# Patient Record
Sex: MALE
[De-identification: names, ages, dates, MRNs, and addresses within clinical notes are randomized per-mention and may not be internally consistent; named-entity substitution may affect disease eponyms.]

---

## 2018-01-19 ENCOUNTER — HOSPITAL ENCOUNTER (OUTPATIENT)
Dept: HOSPITAL 88 - NPA | Age: 48
End: 2018-01-19
Attending: INTERNAL MEDICINE
Payer: COMMERCIAL

## 2018-01-19 DIAGNOSIS — Z02.89: Primary | ICD-10-CM

## 2018-01-19 PROCEDURE — 87493 C DIFF AMPLIFIED PROBE: CPT

## 2018-02-14 ENCOUNTER — HOSPITAL ENCOUNTER (OUTPATIENT)
Dept: HOSPITAL 88 - NPA | Age: 48
End: 2018-02-14
Attending: INTERNAL MEDICINE
Payer: COMMERCIAL

## 2018-02-14 DIAGNOSIS — Z02.89: Primary | ICD-10-CM

## 2018-02-14 LAB
ANION GAP SERPL CALC-SCNC: 16.1 MMOL/L (ref 8–16)
BASOPHILS # BLD AUTO: 0 10*3/UL (ref 0–0.1)
BASOPHILS NFR BLD AUTO: 0.5 % (ref 0–1)
BUN SERPL-MCNC: 43 MG/DL (ref 7–26)
BUN/CREAT SERPL: 46 (ref 6–25)
CALCIUM SERPL-MCNC: 8.8 MG/DL (ref 8.4–10.2)
CHLORIDE SERPL-SCNC: 104 MMOL/L (ref 98–107)
CO2 SERPL-SCNC: 23 MMOL/L (ref 22–29)
DEPRECATED NEUTROPHILS # BLD AUTO: 4.1 10*3/UL (ref 2.1–6.9)
EGFRCR SERPLBLD CKD-EPI 2021: > 60 ML/MIN (ref 60–?)
EOSINOPHIL # BLD AUTO: 0.4 10*3/UL (ref 0–0.4)
EOSINOPHIL NFR BLD AUTO: 5.6 % (ref 0–6)
ERYTHROCYTE [DISTWIDTH] IN CORD BLOOD: 19.2 % (ref 11.7–14.4)
GLUCOSE SERPLBLD-MCNC: 89 MG/DL (ref 74–118)
HCT VFR BLD AUTO: 29.2 % (ref 38.2–49.6)
HGB BLD-MCNC: 8.9 G/DL (ref 14–18)
LYMPHOCYTES # BLD: 1.4 10*3/UL (ref 1–3.2)
LYMPHOCYTES NFR BLD AUTO: 23 % (ref 18–39.1)
MCH RBC QN AUTO: 30.9 PG (ref 28–32)
MCHC RBC AUTO-ENTMCNC: 30.5 G/DL (ref 31–35)
MCV RBC AUTO: 101.4 FL (ref 81–99)
MONOCYTES # BLD AUTO: 0.3 10*3/UL (ref 0.2–0.8)
MONOCYTES NFR BLD AUTO: 5.4 % (ref 4.4–11.3)
NEUTS SEG NFR BLD AUTO: 64.9 % (ref 38.7–80)
PLATELET # BLD AUTO: 269 X10E3/UL (ref 140–360)
POTASSIUM SERPL-SCNC: 4.1 MMOL/L (ref 3.5–5.1)
RBC # BLD AUTO: 2.88 X10E6/UL (ref 4.3–5.7)
SODIUM SERPL-SCNC: 139 MMOL/L (ref 136–145)

## 2018-02-14 PROCEDURE — 85025 COMPLETE CBC W/AUTO DIFF WBC: CPT

## 2018-02-14 PROCEDURE — 80048 BASIC METABOLIC PNL TOTAL CA: CPT

## 2018-02-14 PROCEDURE — 36415 COLL VENOUS BLD VENIPUNCTURE: CPT

## 2019-01-18 ENCOUNTER — HOSPITAL ENCOUNTER (OUTPATIENT)
Dept: HOSPITAL 88 - RAD | Age: 49
End: 2019-01-18
Attending: INTERNAL MEDICINE
Payer: MEDICARE

## 2019-01-18 DIAGNOSIS — R09.89: ICD-10-CM

## 2019-01-18 DIAGNOSIS — J40: ICD-10-CM

## 2019-01-18 DIAGNOSIS — I50.32: ICD-10-CM

## 2019-01-18 DIAGNOSIS — R05: Primary | ICD-10-CM

## 2019-01-18 PROCEDURE — 71046 X-RAY EXAM CHEST 2 VIEWS: CPT

## 2019-01-18 NOTE — DIAGNOSTIC IMAGING REPORT
EXAMINATION: PA and lateral views of the chest.



COMPARISON: None



CLINICAL HISTORY:  Cough, congestion

     

DISCUSSION:



Left internal jugular tunneled hemodialysis catheter tip projects over the

superior cavoatrial junction. Lungs are well-inflated and without focal

airspace consolidation, pleural effusion, or pneumothorax. Atherosclerotic

calcification of the thoracic aorta. Otherwise normal cardiomediastinal

contour.



No acute osseous abnormality.



IMPRESSION: 

No acute cardiopulmonary abnormalities.











Signed by: Dr. Sarmad Hammonds M.D. on 1/18/2019 2:16 PM

## 2020-07-20 ENCOUNTER — HOSPITAL ENCOUNTER (INPATIENT)
Dept: HOSPITAL 88 - ER | Age: 50
LOS: 3 days | Discharge: HOME HEALTH | DRG: 291 | End: 2020-07-23
Attending: INTERNAL MEDICINE | Admitting: INTERNAL MEDICINE
Payer: MEDICARE

## 2020-07-20 VITALS — HEIGHT: 71 IN | WEIGHT: 188.01 LBS | BODY MASS INDEX: 26.32 KG/M2

## 2020-07-20 DIAGNOSIS — E87.5: ICD-10-CM

## 2020-07-20 DIAGNOSIS — N18.6: ICD-10-CM

## 2020-07-20 DIAGNOSIS — Z79.4: ICD-10-CM

## 2020-07-20 DIAGNOSIS — S31.30XA: ICD-10-CM

## 2020-07-20 DIAGNOSIS — B96.1: ICD-10-CM

## 2020-07-20 DIAGNOSIS — Z11.59: ICD-10-CM

## 2020-07-20 DIAGNOSIS — H54.8: ICD-10-CM

## 2020-07-20 DIAGNOSIS — S31.809A: ICD-10-CM

## 2020-07-20 DIAGNOSIS — D63.1: ICD-10-CM

## 2020-07-20 DIAGNOSIS — R53.81: ICD-10-CM

## 2020-07-20 DIAGNOSIS — Z99.2: ICD-10-CM

## 2020-07-20 DIAGNOSIS — I13.2: Primary | ICD-10-CM

## 2020-07-20 DIAGNOSIS — I50.33: ICD-10-CM

## 2020-07-20 DIAGNOSIS — E11.22: ICD-10-CM

## 2020-07-20 LAB
ALBUMIN SERPL-MCNC: 2.6 G/DL (ref 3.5–5)
ALBUMIN/GLOB SERPL: 0.4 {RATIO} (ref 0.8–2)
ALP SERPL-CCNC: 144 IU/L (ref 40–150)
ALT SERPL-CCNC: 10 IU/L (ref 0–55)
ANION GAP SERPL CALC-SCNC: 24.2 MMOL/L (ref 8–16)
BASOPHILS # BLD AUTO: 0.1 10*3/UL (ref 0–0.1)
BASOPHILS NFR BLD AUTO: 0.6 % (ref 0–1)
BUN SERPL-MCNC: 120 MG/DL (ref 7–26)
BUN/CREAT SERPL: 7 (ref 6–25)
CALCIUM SERPL-MCNC: 9.4 MG/DL (ref 8.4–10.2)
CHLORIDE SERPL-SCNC: 104 MMOL/L (ref 98–107)
CK MB SERPL-MCNC: 1.8 NG/ML (ref 0–5)
CK SERPL-CCNC: 22 IU/L (ref 30–200)
CO2 SERPL-SCNC: 18 MMOL/L (ref 22–29)
DEPRECATED APTT PLAS QN: 37.6 SECONDS (ref 23.8–35.5)
DEPRECATED INR PLAS: 1.05
DEPRECATED NEUTROPHILS # BLD AUTO: 6.2 10*3/UL (ref 2.1–6.9)
EGFRCR SERPLBLD CKD-EPI 2021: 3 ML/MIN (ref 60–?)
EOSINOPHIL # BLD AUTO: 0.2 10*3/UL (ref 0–0.4)
EOSINOPHIL NFR BLD AUTO: 2.5 % (ref 0–6)
ERYTHROCYTE [DISTWIDTH] IN CORD BLOOD: 14.6 % (ref 11.7–14.4)
GLOBULIN PLAS-MCNC: 5.8 G/DL (ref 2.3–3.5)
GLUCOSE SERPLBLD-MCNC: 87 MG/DL (ref 74–118)
HCT VFR BLD AUTO: 29 % (ref 38.2–49.6)
HGB BLD-MCNC: 8.8 G/DL (ref 14–18)
LYMPHOCYTES # BLD: 1.3 10*3/UL (ref 1–3.2)
LYMPHOCYTES NFR BLD AUTO: 15.4 % (ref 18–39.1)
MCH RBC QN AUTO: 30 PG (ref 28–32)
MCHC RBC AUTO-ENTMCNC: 30.3 G/DL (ref 31–35)
MCV RBC AUTO: 99 FL (ref 81–99)
MONOCYTES # BLD AUTO: 0.5 10*3/UL (ref 0.2–0.8)
MONOCYTES NFR BLD AUTO: 6 % (ref 4.4–11.3)
NEUTS SEG NFR BLD AUTO: 75.1 % (ref 38.7–80)
PLATELET # BLD AUTO: 226 X10E3/UL (ref 140–360)
POTASSIUM SERPL-SCNC: 6.2 MMOL/L (ref 3.5–5.1)
PROTHROMBIN TIME: 14.4 SECONDS (ref 11.9–14.5)
RBC # BLD AUTO: 2.93 X10E6/UL (ref 4.3–5.7)
SODIUM SERPL-SCNC: 140 MMOL/L (ref 136–145)

## 2020-07-20 PROCEDURE — 84484 ASSAY OF TROPONIN QUANT: CPT

## 2020-07-20 PROCEDURE — 80053 COMPREHEN METABOLIC PANEL: CPT

## 2020-07-20 PROCEDURE — 85610 PROTHROMBIN TIME: CPT

## 2020-07-20 PROCEDURE — 87186 SC STD MICRODIL/AGAR DIL: CPT

## 2020-07-20 PROCEDURE — 87071 CULTURE AEROBIC QUANT OTHER: CPT

## 2020-07-20 PROCEDURE — 99284 EMERGENCY DEPT VISIT MOD MDM: CPT

## 2020-07-20 PROCEDURE — 36415 COLL VENOUS BLD VENIPUNCTURE: CPT

## 2020-07-20 PROCEDURE — 82948 REAGENT STRIP/BLOOD GLUCOSE: CPT

## 2020-07-20 PROCEDURE — 86705 HEP B CORE ANTIBODY IGM: CPT

## 2020-07-20 PROCEDURE — 85025 COMPLETE CBC W/AUTO DIFF WBC: CPT

## 2020-07-20 PROCEDURE — 80048 BASIC METABOLIC PNL TOTAL CA: CPT

## 2020-07-20 PROCEDURE — 80061 LIPID PANEL: CPT

## 2020-07-20 PROCEDURE — 87340 HEPATITIS B SURFACE AG IA: CPT

## 2020-07-20 PROCEDURE — 86706 HEP B SURFACE ANTIBODY: CPT

## 2020-07-20 PROCEDURE — 97139 UNLISTED THERAPEUTIC PX: CPT

## 2020-07-20 PROCEDURE — 83036 HEMOGLOBIN GLYCOSYLATED A1C: CPT

## 2020-07-20 PROCEDURE — 87205 SMEAR GRAM STAIN: CPT

## 2020-07-20 PROCEDURE — 84100 ASSAY OF PHOSPHORUS: CPT

## 2020-07-20 PROCEDURE — 93005 ELECTROCARDIOGRAM TRACING: CPT

## 2020-07-20 PROCEDURE — 83735 ASSAY OF MAGNESIUM: CPT

## 2020-07-20 PROCEDURE — 82550 ASSAY OF CK (CPK): CPT

## 2020-07-20 PROCEDURE — 85730 THROMBOPLASTIN TIME PARTIAL: CPT

## 2020-07-20 PROCEDURE — 83880 ASSAY OF NATRIURETIC PEPTIDE: CPT

## 2020-07-20 PROCEDURE — 82553 CREATINE MB FRACTION: CPT

## 2020-07-20 PROCEDURE — 71045 X-RAY EXAM CHEST 1 VIEW: CPT

## 2020-07-20 PROCEDURE — 87493 C DIFF AMPLIFIED PROBE: CPT

## 2020-07-20 NOTE — EMERGENCY DEPARTMENT NOTE
History of Present Illnes


History of Present Illness


Chief Complaint:  General Medicine Complaints


History of Present Illness


This is a 50 year old  male PRESENTS WITH C/O GENERALIZED WEAKNESS, HAS 

NOT HAD DIALYSIS IN 10 DAYS.


 WAS EXPOSED TO A PERSON WITH COVID AND TOLD AT DIALYSIS HE COULD NOT 

RETURN UNTIL HE GOT A NEGATIVE COVID TEST, PT  HAD A TEST 1 WEEK AGO


BUT RESULTS ARE STILL PENDING.  .


Historian:  Paramedic/EMS


Arrival Mode:  [place]


Onset (how long ago):  day(s) (10)


Location:  ALL OVER


Quality:  WEAKNESS


Radiation:  Reports non-radiation


Severity:  moderate


Onset quality:  sudden


Duration (how long):  day(s) (10)


Timing of current episode:  constant


Progression:  worsening


Chronicity:  new


Context:  Reports other (NO DILAYSIS IN LSAT 10 DAYS)


Relieving factors:  none


Exacerbating factors:  none


Associated symptoms:  Reports denies other symptoms





Past Medical/Family History


Physician Review


I have reviewed the patient's past medical and family history.  Any updates have

been documented here.





Past Medical History


Past Medical History:  Hypertension, Diabetes, ESRD, Hemodyalisis, H

yperlipedemia


Past Surgical History:  Cholecysctectomy


Other Surgery:  


FOOT SURGERY





Social History


Smoking Cessation:  Never Smoker


Counseling Performed:  No


Alcohol Use:  None


Any Illegal Drug Use:  No


Physically hurt or threatened:  No





Family History


Family history of heart diseas:  No


Other family history


HTN,DM





Other


Any Pre-Existing Lines (PICC,:  Yes (l chest)





Review of Systems


Review of Systems


Constitutional:  Reports no symptoms


EENTM:  Reports no symptoms


Cardiovascular:  Reports no symptoms


Respiratory:  Reports no symptoms


Gastrointestinal:  Reports no symptoms


Genitourinary:  Reports no symptoms


Musculoskeletal:  Reports no symptoms


Integumentary:  Reports no symptoms


Neurological:  Reports as per HPI


Psychological:  Reports no symptoms


Endocrine:  Reports no symptoms


Hematological/Lymphatic:  Reports no symptoms





Physical Exam


Related Data


Allergies:  


Coded Allergies:  


     morphine (Verified  Allergy, Mild, 3/25/16)


     methylphenidate (Verified  Allergy, Unknown, 8/18/15)


Triage Vital Signs





Vital Signs








  Date Time  Temp Pulse Resp B/P (MAP) Pulse Ox O2 Delivery O2 Flow Rate FiO2


 


7/20/20 21:38  84 13 168/82 96   








Vital signs reviewed:  Yes





Physical Exam


CONSTITUTIONAL





Constitutional:  Present well-developed, Present well-nourished


HENT


HENT:  Present normocephalic, Present atraumatic, Present oropharynx 

clear/moist, Present nose normal


HENT L/R:  Present left ext ear normal, Present right ext ear normal


EYES





Eyes:  Reports PERRL, Reports conjunctivae normal


NECK


Neck:  Present ROM normal


PULMONARY


Pulmonary:  Present effort normal, Present breath sounds normal


CARDIOVASCULAR





Cardiovascular:  Present regular rhythm, Present heart sounds normal, Present 

capillary refill normal, Present normal rate


GASTROINTESTINAL





Abdominal:  Present soft, Present nontender, Present bowel sounds normal


GENITOURINARY





Genitourinary:  Present exam deferred


SKIN


Skin:  Present warm, Present dry, Present other (SACRAL WOUND, IS CHRONIC PER 

PT, NO SIGN OF INFECTION)


MUSCULOSKELETAL





Musculoskeletal:  Present ROM normal


NEUROLOGICAL





Neurological:  Present alert, Present oriented x 3, Present no gross motor or 

sensory deficits


PSYCHOLOGICAL


Psychological:  Present mood/affect normal, Present judgement normal





Results


Laboratory


Result Diagram:  


7/20/20 1947 7/20/20 1947





Laboratory





Laboratory Tests








Test


 7/20/20


19:47


 


White Blood Count


 8.27 x10e3/uL


(4.8-10.8)


 


Red Blood Count


 2.93 x10e6/uL


(4.3-5.7)


 


Hemoglobin


 8.8 g/dL


(14.0-18.0)


 


Hematocrit


 29.0 %


(38.2-49.6)


 


Mean Corpuscular Volume


 99.0 fL


(81-99)


 


Mean Corpuscular Hemoglobin


 30.0 pg


(28-32)


 


Mean Corpuscular Hemoglobin


Concent 30.3 g/dL


(31-35)


 


Red Cell Distribution Width


 14.6 %


(11.7-14.4)


 


Platelet Count


 226 x10e3/uL


(140-360)


 


Neutrophils (%) (Auto)


 75.1 %


(38.7-80.0)


 


Lymphocytes (%) (Auto)


 15.4 %


(18.0-39.1)


 


Monocytes (%) (Auto)


 6.0  %


(4.4-11.3)


 


Eosinophils (%) (Auto)


 2.5 %


(0.0-6.0)


 


Basophils (%) (Auto)


 0.6 %


(0.0-1.0)


 


Neutrophils # (Auto) 6.2 (2.1-6.9) 


 


Lymphocytes # (Auto) 1.3 (1.0-3.2) 


 


Monocytes # (Auto) 0.5 (0.2-0.8) 


 


Eosinophils # (Auto) 0.2 (0.0-0.4) 


 


Basophils # (Auto) 0.1 (0.0-0.1) 


 


Absolute Immature Granulocyte


(auto 0.03 x10e3/uL


(0-0.1)


 


Prothrombin Time


 14.4 seconds


(11.9-14.5)


 


Prothromb Time International


Ratio 1.05 





 


Activated Partial


Thromboplast Time 37.6 seconds


(23.8-35.5)


 


Sodium Level


 140 mmol/L


(136-145)


 


Potassium Level


 6.2 mmol/L


(3.5-5.1)


 


Chloride Level


 104 mmol/L


()


 


Carbon Dioxide Level


 18 mmol/L


(22-29)


 


Anion Gap


 24.2 mmol/L


(8-16)


 


Blood Urea Nitrogen


 120 mg/dL


(7-26)


 


Creatinine


 17.90 mg/dL


(0.72-1.25)


 


Estimat Glomerular Filtration


Rate 3 ML/MIN (60-) 





 


BUN/Creatinine Ratio 7 (6-25) 


 


Glucose Level


 87 mg/dL


()


 


Calcium Level


 9.4 mg/dL


(8.4-10.2)


 


Total Bilirubin


 0.4 mg/dL


(0.2-1.2)


 


Aspartate Amino Transf


(AST/SGOT) 12 IU/L (5-34) 





 


Alanine Aminotransferase


(ALT/SGPT) 10 IU/L (0-55) 





 


Alkaline Phosphatase


 144 IU/L


()


 


Creatine Kinase


 22 IU/L


()


 


Creatine Kinase MB


 1.80 ng/mL


(0-5.0)


 


Troponin I


 0.032 ng/mL


(0-0.300)


 


B-Type Natriuretic Peptide


 1421.1 pg/mL


(0-100)


 


Total Protein


 8.4 g/dL


(6.5-8.1)


 


Albumin


 2.6 g/dL


(3.5-5.0)


 


Globulin


 5.8 g/dL


(2.3-3.5)


 


Albumin/Globulin Ratio 0.4 (0.8-2.0) 








Lab results reviewed:  Yes





Imaging


Imaging results reviewed:  Yes


Impressions


                              REPORT STATUS: Signed





EXAMINATION:  CHEST SINGLE (PORTABLE)    





INDICATION: MISSED 10 DAYS OF DIALYSIS





COMPARISON:  Chest radiograph 1-.


     


FINDINGS:


TUBES and LINES:  Left IJ tunneled hematosis catheter with tip in the


cavoatrial junction.





LUNGS:  Lungs are moderately inflated. Mild perihilar and interstitial


opacities. Mild patchy bibasilar opacities. No lobar consolidation.





PLEURA:  No pleural effusion or pneumothorax. 





HEART AND MEDIASTINUM:  The cardiomediastinal silhouette is unchanged. Cardiac


loop recorder. Atherosclerotic calcifications of the aortic arch. 





BONES AND SOFT TISSUES:  No acute osseous abnormality. Remote healed left lower


lateral rib fracture.





UPPER ABDOMEN: No free air under the diaphragm.    





IMPRESSION: 


Findings of mild pulmonary interstitial edema. Bibasilar opacities, likely


atelectasis, although infectious process is possible in the appropriate


clinical setting.





Signed by: Dr. Leelee Jacob MD on 7/20/2020 9:23 PM








Dictated By: LEELEE JACOB MD


Electronically Signed By: LEELEE JACOB MD on 07/20/20 2123


Transcribed By: LANNY on 07/20/20 2123 








COPY TO:   GIANA LO MD~





Procedures


12 Lead ECG Interpretation


ECG Interpretation :  


   ECG:  ECG 1


   :  Interpreted by ED physician


   Date:  Jul 20, 2020


   Time:  20:12


   Rhythm:  sinus rhythm


   Rate:  normal


   BPM:  84


   QRS axis:  left


   Conduction:  right bundle branch block


   ST segments normal:  Yes


   T wave inversion:  V1, V2, V3


   Other findings:  no other findings


   Q waves:  III


   Clinical Impression:  abnormal ECG





Critical Care Time


Total Critical Care Time (min):  31


Critcal care necessary due to:  renal failure, other (HYPERKALEMIA)


Critcal care time spent by me:  develop tx plan w patient/surrogate, discussion 

w consultants, evaluation patient response to tx, examination of patient, 

obtaining hx from patient/surrogate, order/review laboratory studies, re-

evaluation of patient condition, review of old charts





Assessment & Plan


Medical Decision Making


MDM


PT WITH GENERALIZED WEAKNESS AFTER MISSING DIALYSIS FOR LAST 10 DAYS





CBC, CMP, EKG, CXR, ORDERED TO EVAL FOR HYPERKALEMIA, PULMONARY EDEMA, 

ARRHYTHMIA





PT FOUND OT HAVE POTASSIUM OF 6.2 FOLLOWING ORDERED


REGULAR INSULIN 10 UNITS IV


CALCIUM GLUCONATE 1 AMP IV


BICARB 1 AMP IV


D50 1 AMP IV





I SPOKE WITH DR SUAZO, STATES WILL HAVE PT RECEIVE DIALYSIS AT 6 AM





PT'S CXR SHOWED MILD PULMONARY EDEMA AND SIGNS OF POSSIBLE COVID 19 VIRAL 

PNEUMONIA, COVID 19 TEST ORDERED 





0700 care transferred to dr ruiz. pt awaiting dialysis and covid 19 result





Assessment & Plan


Final Impression:  


(1) ESRD needing dialysis


(2) Hyperkalemia


Last Vital Signs











  Date Time  Temp Pulse Resp B/P (MAP) Pulse Ox O2 Delivery O2 Flow Rate FiO2


 


7/20/20 21:38  84 13 168/82 96   








Home Meds


Reported Medications


Insulin Human Nph (HUMULIN N) 100 Units/Ml Ml, 14 UNITS SC DAILY


   8/18/15


Insulin Human Nph (HUMULIN N) 100 Units/Ml Ml, 10 UNITS SC DAILY


   8/18/15


Sevelamer Hcl (RENAGEL) 800 Mg Tablet, 2400 MG PO, TAB


   8/18/15


Pantoprazole Sodium* (PROTONIX) 40 Mg Tablet.dr, 40 MG PO DAILY, TAB


   8/18/15


Metoprolol Succinate (METOPROLOL SUCCINATE) 50 Mg Tab.er.24h, 50 MG PO DAILY, MG


   8/18/15


Hydralazine Hcl (HYDRALAZINE HCL) 25 Mg Tab, 25 MG PO Q8HR, TAB


   8/18/15


Hydrocodone Bit/Acetaminophen (NORCO  TABLET) 1 Each Tablet, 10 MG PO Q4HR

PRN for PAIN


   8/18/15


Medications in the ED





Insulin Human Regular 10 unit ONCE  ONCE IV  Last administered on 7/20/20at 

21:08; Admin Dose 10 UNIT;  Start 7/20/20 at 20:30;  Stop 7/20/20 at 20:35;  

Status DC


Sodium Bicarbonate 50 ml NOW  STAT IV  Last administered on 7/20/20at 21:08; 

Admin Dose 50 ML;  Start 7/20/20 at 20:17;  Stop 7/20/20 at 20:19;  Status DC


Calcium Gluconate 13.95 meq/Sodium Chloride 130 ml @  43.333 mls/ hr ONCE  ONCE 

IV  Last administered on 7/20/20at 21:09; Admin Dose 43.333 MLS/HR;  Start 7/2 0/20 at 20:45;  Stop 7/20/20 at 23:44


Dextrose 50 ml NOW  STAT IV  Last administered on 7/20/20at 21:08; Admin Dose 50

ML;  Start 7/20/20 at 20:17;  Stop 7/20/20 at 20:19;  Status DC


Calcium Gluconate  STK-MED ONCE .ROUTE ;  Start 7/20/20 at 21:09;  Stop 7/20/20 

at 21:04;  Status DC


Acetaminophen/ Hydrocodone Bitart 1 ea ONCE  ONCE PO  Last administered on 

7/20/20at 22:08; Admin Dose 1 EA;  Start 7/20/20 at 21:45;  Stop 7/20/20 at 

21:46











GIANA LO MD        Jul 20, 2020 23:32

## 2020-07-20 NOTE — DIAGNOSTIC IMAGING REPORT
EXAMINATION:  CHEST SINGLE (PORTABLE)    



INDICATION: MISSED 10 DAYS OF DIALYSIS



COMPARISON:  Chest radiograph 1-.

     

FINDINGS:

TUBES and LINES:  Left IJ tunneled hematosis catheter with tip in the

cavoatrial junction.



LUNGS:  Lungs are moderately inflated. Mild perihilar and interstitial

opacities. Mild patchy bibasilar opacities. No lobar consolidation.



PLEURA:  No pleural effusion or pneumothorax. 



HEART AND MEDIASTINUM:  The cardiomediastinal silhouette is unchanged. Cardiac

loop recorder. Atherosclerotic calcifications of the aortic arch. 



BONES AND SOFT TISSUES:  No acute osseous abnormality. Remote healed left lower

lateral rib fracture.



UPPER ABDOMEN: No free air under the diaphragm.    



IMPRESSION: 

Findings of mild pulmonary interstitial edema. Bibasilar opacities, likely

atelectasis, although infectious process is possible in the appropriate

clinical setting.



Signed by: Dr. Leelee Valenzuela MD on 7/20/2020 9:23 PM

## 2020-07-21 VITALS — DIASTOLIC BLOOD PRESSURE: 103 MMHG | SYSTOLIC BLOOD PRESSURE: 148 MMHG

## 2020-07-21 VITALS — SYSTOLIC BLOOD PRESSURE: 148 MMHG | DIASTOLIC BLOOD PRESSURE: 103 MMHG

## 2020-07-21 LAB
ANION GAP SERPL CALC-SCNC: 18 MMOL/L (ref 8–16)
ANION GAP SERPL CALC-SCNC: 22.2 MMOL/L (ref 8–16)
ANION GAP SERPL CALC-SCNC: 24.2 MMOL/L (ref 8–16)
BUN SERPL-MCNC: 111 MG/DL (ref 7–26)
BUN SERPL-MCNC: 118 MG/DL (ref 7–26)
BUN SERPL-MCNC: 44 MG/DL (ref 7–26)
BUN/CREAT SERPL: 5 (ref 6–25)
BUN/CREAT SERPL: 6 (ref 6–25)
BUN/CREAT SERPL: 7 (ref 6–25)
CALCIUM SERPL-MCNC: 8.6 MG/DL (ref 8.4–10.2)
CALCIUM SERPL-MCNC: 8.9 MG/DL (ref 8.4–10.2)
CALCIUM SERPL-MCNC: 9.7 MG/DL (ref 8.4–10.2)
CHLORIDE SERPL-SCNC: 104 MMOL/L (ref 98–107)
CHLORIDE SERPL-SCNC: 104 MMOL/L (ref 98–107)
CHLORIDE SERPL-SCNC: 98 MMOL/L (ref 98–107)
CK MB SERPL-MCNC: 2 NG/ML (ref 0–5)
CK SERPL-CCNC: 22 IU/L (ref 30–200)
CO2 SERPL-SCNC: 18 MMOL/L (ref 22–29)
CO2 SERPL-SCNC: 21 MMOL/L (ref 22–29)
CO2 SERPL-SCNC: 25 MMOL/L (ref 22–29)
EGFRCR SERPLBLD CKD-EPI 2021: 3 ML/MIN (ref 60–?)
EGFRCR SERPLBLD CKD-EPI 2021: 3 ML/MIN (ref 60–?)
EGFRCR SERPLBLD CKD-EPI 2021: 6 ML/MIN (ref 60–?)
GLUCOSE SERPLBLD-MCNC: 106 MG/DL (ref 74–118)
GLUCOSE SERPLBLD-MCNC: 75 MG/DL (ref 74–118)
GLUCOSE SERPLBLD-MCNC: 81 MG/DL (ref 74–118)
POTASSIUM SERPL-SCNC: 4 MMOL/L (ref 3.5–5.1)
POTASSIUM SERPL-SCNC: 6.2 MMOL/L (ref 3.5–5.1)
POTASSIUM SERPL-SCNC: 6.2 MMOL/L (ref 3.5–5.1)
SODIUM SERPL-SCNC: 137 MMOL/L (ref 136–145)
SODIUM SERPL-SCNC: 140 MMOL/L (ref 136–145)
SODIUM SERPL-SCNC: 141 MMOL/L (ref 136–145)

## 2020-07-21 PROCEDURE — 5A1D70Z PERFORMANCE OF URINARY FILTRATION, INTERMITTENT, LESS THAN 6 HOURS PER DAY: ICD-10-PCS

## 2020-07-21 RX ADMIN — INSULIN HUMAN SCH UNIT: 100 INJECTION, SOLUTION PARENTERAL at 20:41

## 2020-07-21 NOTE — NUR
TO ROOM ON ROUNDS, PT C/O SLIGHT CHEST PAIN. NOW RESOLVED. VS ASSESSED AND 
RECORDED. MD INFORMED. EKG DONE PER ORDERS. LAB CALLED TO RUN CARDIAC ENZYMES 
ON BLOOD SENT TO LAB PER MD REQUEST.

## 2020-07-21 NOTE — DIAGNOSTIC IMAGING REPORT
EXAMINATION:  CHEST SINGLE (PORTABLE)    



INDICATION:      

^cough

^[id number]

^1326



COMPARISON:  7/20/2020

     

FINDINGS:  AP view   



TUBES and LINES:  Left IJ tunneled hemodialysis catheter. Implanted loop

recorder.



LUNGS: Prominent pulmonary vasculature. No focal lung consolidation.



PLEURA:  No pleural effusion or pneumothorax.



HEART AND MEDIASTINUM:  The cardiomediastinal silhouette is unremarkable.    



BONES AND SOFT TISSUES:  No acute osseous lesion.  Soft tissues are

unremarkable.



UPPER ABDOMEN: No free air under the diaphragm.    



IMPRESSION: 

Central pulmonary venous congestion. No lung consolidation to suggest

pneumonia.





Signed by: Elliott Blood MD on 7/21/2020 2:16 PM

## 2020-07-22 VITALS — SYSTOLIC BLOOD PRESSURE: 182 MMHG | DIASTOLIC BLOOD PRESSURE: 91 MMHG

## 2020-07-22 VITALS — SYSTOLIC BLOOD PRESSURE: 123 MMHG | DIASTOLIC BLOOD PRESSURE: 72 MMHG

## 2020-07-22 VITALS — DIASTOLIC BLOOD PRESSURE: 91 MMHG | SYSTOLIC BLOOD PRESSURE: 182 MMHG

## 2020-07-22 VITALS — SYSTOLIC BLOOD PRESSURE: 168 MMHG | DIASTOLIC BLOOD PRESSURE: 78 MMHG

## 2020-07-22 VITALS — SYSTOLIC BLOOD PRESSURE: 138 MMHG | DIASTOLIC BLOOD PRESSURE: 73 MMHG

## 2020-07-22 VITALS — DIASTOLIC BLOOD PRESSURE: 70 MMHG | SYSTOLIC BLOOD PRESSURE: 141 MMHG

## 2020-07-22 LAB
ALBUMIN SERPL-MCNC: 2.5 G/DL (ref 3.5–5)
ALBUMIN/GLOB SERPL: 0.4 {RATIO} (ref 0.8–2)
ALP SERPL-CCNC: 136 IU/L (ref 40–150)
ALT SERPL-CCNC: 11 IU/L (ref 0–55)
ANION GAP SERPL CALC-SCNC: 17.5 MMOL/L (ref 8–16)
BASOPHILS # BLD AUTO: 0 10*3/UL (ref 0–0.1)
BASOPHILS NFR BLD AUTO: 0.3 % (ref 0–1)
BUN SERPL-MCNC: 48 MG/DL (ref 7–26)
BUN/CREAT SERPL: 5 (ref 6–25)
CALCIUM SERPL-MCNC: 9.1 MG/DL (ref 8.4–10.2)
CHLORIDE SERPL-SCNC: 98 MMOL/L (ref 98–107)
CHOLEST SERPL-MCNC: 106 MD/DL (ref 0–199)
CHOLEST/HDLC SERPL: 2.6 {RATIO} (ref 3.9–4.7)
CO2 SERPL-SCNC: 28 MMOL/L (ref 22–29)
DEPRECATED NEUTROPHILS # BLD AUTO: 5.9 10*3/UL (ref 2.1–6.9)
DEPRECATED PHOSPHATE SERPL-MCNC: 5.5 MG/DL (ref 2.3–4.7)
EGFRCR SERPLBLD CKD-EPI 2021: 5 ML/MIN (ref 60–?)
EOSINOPHIL # BLD AUTO: 0.1 10*3/UL (ref 0–0.4)
EOSINOPHIL NFR BLD AUTO: 1.4 % (ref 0–6)
ERYTHROCYTE [DISTWIDTH] IN CORD BLOOD: 14.5 % (ref 11.7–14.4)
GLOBULIN PLAS-MCNC: 5.6 G/DL (ref 2.3–3.5)
GLUCOSE SERPLBLD-MCNC: 85 MG/DL (ref 74–118)
HCT VFR BLD AUTO: 29.2 % (ref 38.2–49.6)
HDLC SERPL-MSCNC: 41 MG/DL (ref 40–60)
HGB BLD-MCNC: 8.9 G/DL (ref 14–18)
LDLC SERPL CALC-MCNC: 50 MG/DL (ref 60–130)
LYMPHOCYTES # BLD: 0.9 10*3/UL (ref 1–3.2)
LYMPHOCYTES NFR BLD AUTO: 12.6 % (ref 18–39.1)
MAGNESIUM SERPL-MCNC: 1.9 MG/DL (ref 1.3–2.1)
MCH RBC QN AUTO: 30.1 PG (ref 28–32)
MCHC RBC AUTO-ENTMCNC: 30.5 G/DL (ref 31–35)
MCV RBC AUTO: 98.6 FL (ref 81–99)
MONOCYTES # BLD AUTO: 0.4 10*3/UL (ref 0.2–0.8)
MONOCYTES NFR BLD AUTO: 5.7 % (ref 4.4–11.3)
NEUTS SEG NFR BLD AUTO: 79.7 % (ref 38.7–80)
PLATELET # BLD AUTO: 225 X10E3/UL (ref 140–360)
POTASSIUM SERPL-SCNC: 4.5 MMOL/L (ref 3.5–5.1)
RBC # BLD AUTO: 2.96 X10E6/UL (ref 4.3–5.7)
SODIUM SERPL-SCNC: 139 MMOL/L (ref 136–145)
TRIGL SERPL-MCNC: 73 MG/DL (ref 0–149)

## 2020-07-22 RX ADMIN — WHITE PETROLATUM, ZINC OXIDE SCH GM: 66.2; 13.4 OINTMENT TOPICAL at 13:15

## 2020-07-22 RX ADMIN — METOPROLOL SUCCINATE SCH MG: 50 TABLET, EXTENDED RELEASE ORAL at 09:00

## 2020-07-22 RX ADMIN — SEVELAMER CARBONATE SCH MG: 800 TABLET, FILM COATED ORAL at 17:12

## 2020-07-22 RX ADMIN — PANTOPRAZOLE SODIUM SCH MG: 40 TABLET, DELAYED RELEASE ORAL at 09:10

## 2020-07-22 RX ADMIN — INSULIN HUMAN SCH UNIT: 100 INJECTION, SOLUTION PARENTERAL at 15:54

## 2020-07-22 RX ADMIN — INSULIN HUMAN SCH UNIT: 100 INJECTION, SOLUTION PARENTERAL at 07:30

## 2020-07-22 RX ADMIN — HYDRALAZINE HYDROCHLORIDE SCH MG: 25 TABLET ORAL at 22:00

## 2020-07-22 RX ADMIN — CALCIUM ACETATE SCH MG: 667 CAPSULE ORAL at 17:12

## 2020-07-22 RX ADMIN — CALCIUM ACETATE SCH MG: 667 CAPSULE ORAL at 12:11

## 2020-07-22 RX ADMIN — INSULIN HUMAN SCH UNIT: 100 INJECTION, SOLUTION PARENTERAL at 11:30

## 2020-07-22 RX ADMIN — SEVELAMER CARBONATE SCH MG: 800 TABLET, FILM COATED ORAL at 12:11

## 2020-07-22 RX ADMIN — MUPIROCIN SCH GM: 20 OINTMENT TOPICAL at 09:00

## 2020-07-22 RX ADMIN — INSULIN HUMAN SCH UNIT: 100 INJECTION, SOLUTION PARENTERAL at 21:00

## 2020-07-22 RX ADMIN — CLOPIDOGREL BISULFATE SCH MG: 75 TABLET, FILM COATED ORAL at 09:10

## 2020-07-22 RX ADMIN — PANTOPRAZOLE SODIUM SCH MG: 40 TABLET, DELAYED RELEASE ORAL at 17:12

## 2020-07-22 RX ADMIN — MUPIROCIN SCH GM: 20 OINTMENT TOPICAL at 17:00

## 2020-07-22 RX ADMIN — HYDRALAZINE HYDROCHLORIDE SCH MG: 25 TABLET ORAL at 14:00

## 2020-07-22 NOTE — HISTORY AND PHYSICAL
CHIEF COMPLAINT:  Missed dialysis sessions.

 

HISTORY OF PRESENT ILLNESS:  This is a 50-year-old  man, who presented 
to [facility] Emergency Room with a diagnosis of severe 
hyperkalemia

secondary to missed hemodialysis sessions.  The patient stated that his last

hemodialysis session was on July 11, 2020.  When the patient initially presented
to [facility] Emergency Room, his potassium was 6.2.  His B-
type

natriuretic peptide level was 1421.  Chest film on admission revealed pulmonary

interstitial edema with bibasilar opacities likely atelectasis.  The patient did
undergo

hemodialysis during this hospitalization.  The patient's potassium today is 4.5.
 The

patient's B-type natriuretic peptide level today is 1080.  The patient voices no

complaints.  The patient, however, has scrotal wounds that are draining purulent

material.  The patient's white blood cell count today is 7300 with 79% segmented

neutrophils.  He is currently being hemodialyzed at this time.  Apparently, the 
patient

wants to left the emergency room yesterday after hemodialysis, but a ride home 
could not

be procured.  His care at home is questionable.  The patient, however, states he
does

have a provider that visits him twice a day. 

 

REVIEW OF SYSTEMS:

GENERAL:  Weight has been stable.  No fever or chills, but he has been weaker 
lately. 

HEENT:  No headaches.  No visual changes. 

CARDIOVASCULAR/RESPIRATORY:  No chest pain.  He was slightly short of breath 
when he was

admitted, but since resolved.  Denies any coughing. 

GI:  He states in the past 4 days, he has had diarrhea.  Denies any nausea or 
vomiting. 

:  He does not urinate.  He has end-stage renal disease. 

NEUROMUSCULAR:  He has numbness in his feet.  He is predominantly bedbound.

 

ALLERGIES:  

1. METHYLPHENIDATE.

2. MORPHINE.

 

HOME MEDICATIONS:  

1. Calcium acetate 667 mg 3 capsules 3 times a day.

2. Clopidogrel 75 mg daily.

3. Metoprolol succinate 50 mg once a day.

4. Omeprazole 40 mg daily.

5. Sevelamer 2400 mg t.i.d. with meals.

6. Hydralazine 25 mg tid.

 

PAST MEDICAL HISTORY:  

1. Recurrent skin abscesses.

2. End-stage renal disease (he has been on hemodialysis since 2010).

3. 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

4. Hypertensive heart disease.

5. Chronic diastolic congestive heart failure.

6. Anemia secondary to chronic disease/chronic kidney disease.

7. Recurrent bouts of hidradenitis suppurativa.

 

PAST SURGICAL HISTORY:  

1. Right foot surgery.

2. Left upper extremity AV fistula placement.

3. Laparoscopic cholecystectomy.

 

FAMILY HISTORY:  Mother had type 2 diabetes mellitus, but she has since passed. 
His

sister also has type 2 diabetes mellitus. 

 

SOCIAL HISTORY:  This man is single.  He apparently lives with his nephew and 
his

sister.  He is unemployed and receiving disability benefits.  The patient has no
history

of tobacco or alcohol use. 

 

PHYSICAL EXAMINATION:

GENERAL:  He is awake.  He is alert.  He looks chronically ill.  He is pleasant 
and

cooperative on exam.  He does not appear to be in any obvious respiratory 
distress. 

VITAL SIGNS:  Height 5 feet 11 inches, weight 188 pounds, BMI 26.  Blood 
pressure

168/78, pulse 88, respiratory rate is 18, oxygen saturation 93% on room air, and

temperature 98.1. 

INTEGUMENT:  Skin is warm and dry.  Slight pallor.  No jaundice or diaphoresis. 
The

patient has scrotal wound draining purulent material. 

HEENT:  Anterior sclerae with moist mucous membranes.  The patient has 
enucleated right

eye.  He is legally blind. 

NECK:  Supple.  No evidence of jugular venous distention. 

CARDIOVASCULAR:  Distant heart sounds.  Regular rate and rhythm with S4 gallop. 

LUNGS:  No rales.  No rhonchi, but he has crackles at the bases. 

ABDOMEN:  Soft.  Normal bowel sounds. 

EXTREMITIES:  No edema.  He has muscle wasting. 

NEUROLOGIC:  He is bedbound.  No gross deficits, but he is globally weak.



DIAGNOSES:  

1. End-stage renal disease.

2. Hyperkalemia secondary to missed hemodialysis sessions, resolved.

3. Scrotal wounds (infected).

4. Acute-on-chronic diastolic congestive heart failure, resolving.

5. Hypertensive heart disease.

6. Type 2 diabetes mellitus with severe diabetic retinopathy and peripheral 
neuropathy.

 

PLAN:  

1. We will likely discharge the patient home today, but if his home environment 
is

unsafe, then he will be referred to a skilled nursing facility. 

2. We will order mupirocin 2% ointment to be applied to the patient's scrotum 
twice a

day for 10 days. 

3. Resume home medications.

4. Proceed with hemodialysis today.

5. Blood glucose monitoring control.

6. Blood pressure monitoring control. 



Spent an hour in the care of this patient.

 

 

 

 

______________________________

MD ALICJA Gerardo/ORQUIDEA

D:  07/22/2020 08:17:35

T:  07/22/2020 09:04:06

Job #:  [id number]

 

MANINDER

## 2020-07-22 NOTE — NUR
Patient's sister has been notified that he has been discharged and will need someone to 
arrange to come pick him up. Patient confirmed Prince (sister) is who he will be living 
with.

## 2020-07-22 NOTE — NUR
WOUND CARE CONSULT FOR  49 YO  MALE HX OF ESRD,WEAKNESS HYPERKALEMIA

MELISSA 9 ON STRICT PUP STATUS AND INTERVENTIONS AND ALTERNATING PRESSURE            MATTRESS 
                





LABS: WBC-7.38

HGB_8.9

GLUCOSE-85





SKIN ASSESSMENT COMPLETE PATIENT PRESENTS WITH NEWLY  HEALED AREAS TO BUTTOCKS AND BILATERAL 
HEELS

SCROTUM HAS OPEN AREAS RELATED TO MOISTURE IRRITATION AND URINE BURN AS REPORTED BY PATIENT. 
 UPPER OPEN AREA TO SCROTUM 1CM X 2CM X 0.1CM

LOWER OPEN AREA TO SCROTUM 1CM X 4CM X 0.1CM



RECOMMENDATIONS: 

NURSING TO CONTINUE TO MAINTAIN  STRICT PUP STATUS AND INTERVENTIONS AND ALTERNATING 
PRESSURE MATTRESS

NURSING TO CONTINUE TO ASSIST PATIENT OUT OF BED FOR MEALS AND AS MUCH AS TOLERATED

NURSING TO CONTINUE TO ASSIST PATIENT AS NEEDED WITH MEALS AND NUTRITIONAL SUPPLEMENTS TO 
ENSURE PROPER REQUIREMENTS FOR HEALING 

NURSING TO CONTINUE TO OFFLOAD FEET AND HEELS AS NEEDED WITH PILLOW SUSPENSION WHEN IN BED 

NURSING TO CLEAN SCROTUM WITH GENTLE SOAP DAILY AND APPLY ZINC/BALSM PERU BARRIER PASTE

NURSING TO CLEAN LEFT GREAT TOE WITH NORMAL SALINE DAILY AND APPLY VENELEX OINTMENT AND 
LEAVE OPEN TO AIR






-------------------------------------------------------------------------------

Addendum: 07/22/20 at 1308 by Niels Wolfe RN

-------------------------------------------------------------------------------

Amended: Links added.

## 2020-07-22 NOTE — NUR
NURSE KESHAV SEES THIS PATIENT 3 TIMES A WEEK ON M, WED AND FRIDAYS FOR WOUND CARE WITH [facility] IF NEED TO FAX ORDERS THEY WILL GO [phone], OFFICE IS 
[phone]. HIS FAMILY IS BEING PAID FOR BEING THE PROVIDER FOR 40 HOURS A WEEK, THEY HAVE 
BEEN TRYING TO GET THEM TO SWITCH TO A PROVIDER BUT DIFFERENT FAMILY MEMBERS WANT TO DO IT. 
SHE STATES THE SON WHOM IS GETTING PAID TO BE THER PROVIDER AND HIS LEGAL ADDRESS IS ON 
[facility] BUT HE IS STAYING AT HIS SISTERS [facility] AT [address], [place] 
[address]. EDUCATED THAT HE CAN BE MOVED TO A LOCAL [facility] AND THE PROCESS TO ACHIEVE. SHE 
STATES THEY WILL SEE HIM ON FRIDAY.

## 2020-07-22 NOTE — NUR
Completed bedside nursing report with morning nurse. Pt alert and oriented to name, lying in 
bed HOB 30 degrees. Denies pain at this time. Call light within reach.

## 2020-07-22 NOTE — NUR
PT HOME DIALYSIS UNIT IS [facility] [phone] CALLED AND SPOKE WITH NURSE KAMAR 
WHOM STATES HE IS A T, TH AND SATURDAY AT 1030AM. SHE STATES HE IS NON-COMPLAINT AND TOLD 
THEM HE THINKS HE WAS EXPOSED SO HE CHOSE NOT TO GO TREATMENT, I ASKED IF THE PT WOULD 
BETTER BE SERVED BY TRANSFERRED TO A [place] LOCATION TO BE CLOSER TO SISTERS HOME,SHE 
STATES THE PT HAS NEVER ASKED TO BE MOVED. IF HE CHOOSES TO DO THAT ALL HE HAS TO DO IS CALL 
 [phone]. DOCTOR HAS ASKED FOR A APS CALL TO CHECK HOME SITUATION, 
WILL REPORT ALL THAT HAS BEEN REPORTED. WILL FAX NEGATIVE RESULTS FROM COVID TEST TO 
DIALYSIS UNIT [phone]

## 2020-07-22 NOTE — NUR
Nutrition Screen Note



RD Recommendation for Physician: 

-Continue current diet as ordered



Plan of Care: RD following, monitoring for tolerance and adequacy



Nutrition reason for involvement: Nutrition Risk Trigger and diagnosis - ESRD



Primary Diagnose(s): ESRD, hyperkalemia, and weakness



PMH: Recurrent skin abscesses, End-stage renal disease (he has been on hemodialysis since 
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 
Hypertensive heart disease, Chronic diastolic congestive heart failure, Anemia secondary to 
chronic disease/chronic kidney disease, Recurrent bouts of hidradenitis suppurativa.



Ht: 71 in 

Wt:188 lb

BMI: 26.2 kg/m2

IBW:172 lb



RD Assessment:

(7/22) Chart reviewed. Labs and meds reviewed. Pt is a 50 year old male admitted with ESRD, 
hyperkalemia, and weakness. Pt was sleeping at time of visit. It is recorded that pt 
consumed 75% of breakfast this morning. Weight has been stable per H/P MD note. RD is 
available for diet education as needed. Will continue to monitor.



Current Diet:  renal/diabetic diet



Malnutrition Evaluation (7/22/20)

The patient does not meet criteria for a specified degree of malnutrition at this time. Will 
re-evaluate at follow-up as appropriate. 









Diet Education Needs Assessment: RD is available for diet education as needed









Nutrition Care Level: low





Signed: Linnea Simmons, FREDA, LD

## 2020-07-22 NOTE — NUR
CALLED SISTER BIBI [phone], SHE STATES PT LIVES WITH THEIR SISTER [phone] 
(CALLED AND LEFT MESSAGE), SHE CONFIRMED THERE IS A NURSE THAT COMES 2 TIMES A DAY BUT CANT 
REMEMBER NAME OR COMPANY [phone] (CALLED AND LEFT MESSAGE TO RETURN CALL), FILED APS 
REPORT PER DOCTOR REQUEST report is [id number], SISTER BIBI IS HELPFUL AND PROVIDED 
INFORMATION, WAS CONCERNED ABOUT HER BROTHER AND ASKED IF ANYTHING HAPPENS TO PLEASE CALL 
HER.

## 2020-07-22 NOTE — NUR
CONSULT FOR SOCIAL SERVICE PUT IN BECAUSE FAMILY DID NOT ANSWER PHONE, PHONE NUMBERS FOR 
FAMILY ARE FLORI [phone] BIBI [phone].

## 2020-07-22 NOTE — NUR
Dr. Keller is here making rounds and said from his standpoint there is no surgical 
intervention needed but patient will need antibiotics. Notified Dr. Ford.

## 2020-07-22 NOTE — NUR
Patient is alert and oriented x3, legally blind both eyes. Discharge instructions and f/u 
were discussed. He verbalized understanding.

## 2020-07-22 NOTE — NUR
APS CALLED AND STATES ALREADY A CASE ON PT. SHE WILL FOLLOW UP, SHE STATES PT IS INDEPENDENT 
AND ABLE TO GT AROUND OWN HOME. SHE WILL FOLLOW UP WITH THE FAMILY.

## 2020-07-22 NOTE — NUR
SPOKE WITH [facility] HE IS THEIR PT. THEY WERE ABLE TO PROVIDE ADDRESS WHERE PT 
IS GETTING CARE Maribeth [address] [place] [id number], HOME NUMBER [phone] ([facility]) HAS A SON ALSO THAT IS PAID TO BE A PROVIDER FOR 40 HOURS A WEEK FLORI TSE 
[phone], SHE ALSO STATES THEY NEED A RESUME HOME HEALTH ORDER AND A 
COPY OF THE COVID RESULTS FAXED TO THEM. CALLED AND INFORMED NURSE OF ALL INTERACTION AND 
NEED FOR A ORDER TO RESUME.

## 2020-07-23 VITALS — SYSTOLIC BLOOD PRESSURE: 151 MMHG | DIASTOLIC BLOOD PRESSURE: 99 MMHG

## 2020-07-23 VITALS — DIASTOLIC BLOOD PRESSURE: 81 MMHG | SYSTOLIC BLOOD PRESSURE: 134 MMHG

## 2020-07-23 VITALS — DIASTOLIC BLOOD PRESSURE: 75 MMHG | SYSTOLIC BLOOD PRESSURE: 140 MMHG

## 2020-07-23 VITALS — SYSTOLIC BLOOD PRESSURE: 154 MMHG | DIASTOLIC BLOOD PRESSURE: 77 MMHG

## 2020-07-23 VITALS — DIASTOLIC BLOOD PRESSURE: 77 MMHG | SYSTOLIC BLOOD PRESSURE: 122 MMHG

## 2020-07-23 VITALS — DIASTOLIC BLOOD PRESSURE: 81 MMHG | SYSTOLIC BLOOD PRESSURE: 136 MMHG

## 2020-07-23 LAB
ALBUMIN SERPL-MCNC: 2.5 G/DL (ref 3.5–5)
ALBUMIN/GLOB SERPL: 0.4 {RATIO} (ref 0.8–2)
ALP SERPL-CCNC: 126 IU/L (ref 40–150)
ALT SERPL-CCNC: 8 IU/L (ref 0–55)
ANION GAP SERPL CALC-SCNC: 15.4 MMOL/L (ref 8–16)
ANISOCYTOSIS BLD QL SMEAR: SLIGHT
BASOPHILS # BLD AUTO: 0.1 10*3/UL (ref 0–0.1)
BASOPHILS NFR BLD AUTO: 0.7 % (ref 0–1)
BUN SERPL-MCNC: 21 MG/DL (ref 7–26)
BUN/CREAT SERPL: 3 (ref 6–25)
CALCIUM SERPL-MCNC: 9.4 MG/DL (ref 8.4–10.2)
CHLORIDE SERPL-SCNC: 102 MMOL/L (ref 98–107)
CO2 SERPL-SCNC: 27 MMOL/L (ref 22–29)
DEPRECATED NEUTROPHILS # BLD AUTO: 5.3 10*3/UL (ref 2.1–6.9)
EGFRCR SERPLBLD CKD-EPI 2021: 9 ML/MIN (ref 60–?)
EOSINOPHIL # BLD AUTO: 0.1 10*3/UL (ref 0–0.4)
EOSINOPHIL NFR BLD AUTO: 1.8 % (ref 0–6)
ERYTHROCYTE [DISTWIDTH] IN CORD BLOOD: 14.9 % (ref 11.7–14.4)
GLOBULIN PLAS-MCNC: 5.8 G/DL (ref 2.3–3.5)
GLUCOSE SERPLBLD-MCNC: 80 MG/DL (ref 74–118)
HCT VFR BLD AUTO: 27.8 % (ref 38.2–49.6)
HGB BLD-MCNC: 8.6 G/DL (ref 14–18)
HYPOCHROMIA BLD QL SMEAR: SLIGHT
LYMPHOCYTES # BLD: 1.4 10*3/UL (ref 1–3.2)
LYMPHOCYTES NFR BLD AUTO: 18.4 % (ref 18–39.1)
MACROCYTES BLD QL SMEAR: SLIGHT
MCH RBC QN AUTO: 32.8 PG (ref 28–32)
MCHC RBC AUTO-ENTMCNC: 30.9 G/DL (ref 31–35)
MCV RBC AUTO: 106.1 FL (ref 81–99)
MONOCYTES # BLD AUTO: 0.5 10*3/UL (ref 0.2–0.8)
MONOCYTES NFR BLD AUTO: 7.2 % (ref 4.4–11.3)
NEUTS SEG NFR BLD AUTO: 71.6 % (ref 38.7–80)
PLAT MORPH BLD: NORMAL
PLATELET # BLD AUTO: 187 X10E3/UL (ref 140–360)
PLATELET # BLD EST: ADEQUATE 10*3/UL
POTASSIUM SERPL-SCNC: 4.4 MMOL/L (ref 3.5–5.1)
RBC # BLD AUTO: 2.62 X10E6/UL (ref 4.3–5.7)
RBC MORPH BLD: NORMAL
SODIUM SERPL-SCNC: 140 MMOL/L (ref 136–145)

## 2020-07-23 RX ADMIN — SEVELAMER CARBONATE SCH MG: 800 TABLET, FILM COATED ORAL at 08:47

## 2020-07-23 RX ADMIN — PANTOPRAZOLE SODIUM SCH MG: 40 TABLET, DELAYED RELEASE ORAL at 13:08

## 2020-07-23 RX ADMIN — HYDROCODONE BITARTRATE AND ACETAMINOPHEN PRN EA: 7.5; 325 TABLET ORAL at 09:21

## 2020-07-23 RX ADMIN — SEVELAMER CARBONATE SCH MG: 800 TABLET, FILM COATED ORAL at 17:20

## 2020-07-23 RX ADMIN — HYDRALAZINE HYDROCHLORIDE SCH MG: 25 TABLET ORAL at 16:00

## 2020-07-23 RX ADMIN — CLOPIDOGREL BISULFATE SCH MG: 75 TABLET, FILM COATED ORAL at 08:47

## 2020-07-23 RX ADMIN — CALCIUM ACETATE SCH MG: 667 CAPSULE ORAL at 17:20

## 2020-07-23 RX ADMIN — CALCIUM ACETATE SCH MG: 667 CAPSULE ORAL at 13:08

## 2020-07-23 RX ADMIN — SEVELAMER CARBONATE SCH MG: 800 TABLET, FILM COATED ORAL at 13:08

## 2020-07-23 RX ADMIN — WHITE PETROLATUM, ZINC OXIDE SCH GM: 66.2; 13.4 OINTMENT TOPICAL at 09:00

## 2020-07-23 RX ADMIN — INSULIN HUMAN SCH UNIT: 100 INJECTION, SOLUTION PARENTERAL at 07:30

## 2020-07-23 RX ADMIN — CALCIUM ACETATE SCH MG: 667 CAPSULE ORAL at 08:47

## 2020-07-23 RX ADMIN — HYDROCODONE BITARTRATE AND ACETAMINOPHEN PRN EA: 7.5; 325 TABLET ORAL at 00:10

## 2020-07-23 RX ADMIN — INSULIN HUMAN SCH UNIT: 100 INJECTION, SOLUTION PARENTERAL at 11:30

## 2020-07-23 RX ADMIN — PANTOPRAZOLE SODIUM SCH MG: 40 TABLET, DELAYED RELEASE ORAL at 17:20

## 2020-07-23 RX ADMIN — PANTOPRAZOLE SODIUM SCH MG: 40 TABLET, DELAYED RELEASE ORAL at 08:47

## 2020-07-23 RX ADMIN — METOPROLOL SUCCINATE SCH MG: 50 TABLET, EXTENDED RELEASE ORAL at 17:20

## 2020-07-23 RX ADMIN — MUPIROCIN SCH GM: 20 OINTMENT TOPICAL at 17:33

## 2020-07-23 RX ADMIN — HYDRALAZINE HYDROCHLORIDE SCH MG: 25 TABLET ORAL at 22:00

## 2020-07-23 RX ADMIN — INSULIN HUMAN SCH UNIT: 100 INJECTION, SOLUTION PARENTERAL at 21:00

## 2020-07-23 RX ADMIN — MUPIROCIN SCH GM: 20 OINTMENT TOPICAL at 17:32

## 2020-07-23 RX ADMIN — INSULIN HUMAN SCH UNIT: 100 INJECTION, SOLUTION PARENTERAL at 16:30

## 2020-07-23 RX ADMIN — HYDRALAZINE HYDROCHLORIDE SCH MG: 25 TABLET ORAL at 05:44

## 2020-07-23 NOTE — DISCHARGE SUMMARY
ADMIT DIAGNOSES:  

1. Acute on chronic diastolic heart failure.

2. End-stage renal disease.

3. Hyperkalemia.

4. Hypertensive heart disease.

5. 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

6. Anemia secondary to chronic disease.

 

DISCHARGE DIAGNOSES:  

1. Acute on chronic diastolic congestive heart failure, resolved.

2. End-stage renal disease.  

3. Hyperkalemia, resolved.  

4. Left block/scrotal folliculitis with surrounding cellulitis.

5. 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

6. Hypertensive heart disease.

7. Anemia secondary to chronic disease.

8. Legal blindness.

9. Physical debility.

 

HOSPITAL COURSE:  This is a 50-year-old  man, who was initially admitted
to [facility] with diagnosis of hyperkalemia and acute 
pulmonary

edema secondary to missed hemodialysis sessions.  The patient stated that his 
last

hemodialysis was on July 11, 2020.  The patient stated that he elected not to 
undergo

hemodialysis for the fear of keira COVID-19 infection.  During this

hospitalization, the patient was dialyzed on two consecutive days, which 
corrected his

pulmonary edema and hyperkalemia.  The patient's potassium on admission was 6.2 
and on

discharge was 4.4.  The patient was seen by his nephrologist during this 
hospitalization

namely Dr. Edwards_ During this hospitalization, the patient was found to have

folliculitis of his scrotal area as well as his left buttock area that was 
draining

purulent material.  He was started on intravenous vancomycin.  He was seen by 
general

surgeon namely Dr. Keller, who stated that the patient did not want incision 
and

drainage of these small abscesses.  The general surgeon recommended that the 
patient

continue intravenous antibiotics for this condition.  These draining abscesses 
were

cultured and the results were still pending on discharge.  The patient was 
started on

intravenous vancomycin, which he received twice, first on July 22, 2020 and 
second on

July 23, 2020.  The decision was made to treat this patient for the next two 
weeks with

intravenous vancomycin 1 g intravenous after hemodialysis Monday, Wednesday, and
Friday

for the next two weeks.  The patient was also seen by physical therapy during 
this

hospitalization.  A decision was made to transfer the patient to a skilled 
nursing

facility where he could receive two weeks of intravenous antibiotic therapy as 
well as

wound care to his scrotal and buttock abscesses. 

 

CONDITION ON DISCHARGE:  The patient's condition on discharge was stable with an
overall

fair prognosis. 

 

DISCHARGE MEDICATIONS:  

1. Mupirocin 2% ointment that will be applied to the patient's nostrils and to 
his

scrotal and buttock wounds twice a day for next 10 days. 

2. Vancomycin 1 g intravenous after hemodialysis for the next 2 weeks.

3. Sevelamer 2400 mg t.i.d. with meals.

4. Pantoprazole 40 mg daily.

5. Clopidogrel 75 mg daily.

6. Calcium acetate 667 mg t.i.d. meals.

7. Hydralazine 25 mg p.o. q.8 hours.

8. Acetaminophen 650 mg every 4 hours p.r.n. temperature 99.5 or higher.

9. Metoprolol succinate 50 mg daily.

10. Humulin R insulin sliding scale.

 

FOLLOWUP INSTRUCTIONS:  The patient will be transferred to a local skilled 
nursing

facility namely [facility] where he will receive intravenous 
antibiotics

namely vancomycin as previous stated.  At this facility, the patient will also 
be able

to receive wound care to his scrotal and left buttock draining abscesses. 

 

 

 

 

______________________________

MD ALICJA Gerardo/ORQUIDEA

D:  07/23/2020 09:21:59

T:  07/23/2020 09:55:23

Job #:  [id number]

 

MTDD

## 2020-07-23 NOTE — NUR
Nursing report completed with morning nurse. Pt alert and oriented to name, lying in bed HOB 
30 degrees. Denies pain at this time. Call light within reach. no

## 2020-07-23 NOTE — NUR
WENT TO SPEAK WITH PT ABOUT THE SNF, HE STATES [facility] IS WHERE HE GOT THE SCROTAL 
ABSCESS, HE STATES HE WOULD PREFER TO NOT GO, I ASKED WHICH SISTER ARE WE SUPPOSED TO BE 
ABLE TO SPEAKING WITH, HE STATES EPI, I ASKED HIM IF HE WANTED TO CALL HER AND TALK WITH 
HER ABOUT THE SNF AND I CAN COME BACK IN ABOUT AN HOUR TO GET HIS FINAL ANSWER. DID CALL 
BUSINESS OFFICE AND HAD FACESHEET CHANGED. TOOK COPIES OF FACESHEET TO CHART.

## 2020-07-23 NOTE — NUR
Pt discharged home via WC to private vehicle. Pt no acute distress. D/C'd IV 20g left FA, Pt 
tolerated well. Pt received discharge instructions with education on the importance of 
hemodialysis, Pt verbalized understanding, no further questions or concerns. Pt left with 
belongings.

## 2020-07-23 NOTE — NUR
WENT TO PT ROOM, HE WAS SITTING UP EATING LUNCH, HE STATES HE SPOKE WITH HIS SISTER AND SHE 
WILL BE HERE TO PICK HIM UP ABOUT 7PM TONIGHT. LET NURSE AND DOCTOR KNOW.

## 2020-07-23 NOTE — NUR
CURRENTLY CANNOT ADDRESS THE SNF ORDER AS THERE IS NO IV ABX OR A PHYSICAL THERAPY EVAL, 
DOES NOT MEET CRITERIA, SPOKE WITH NURSE WHOM WILL SPEAK WITH DOCTOR TO GET PT EVAL AND 
CHECK ABOUT WOUNDS AND IF IV ABX ARE NEEDED.

## 2020-07-24 NOTE — DISCHARGE SUMMARY
ADDENDUM:  

The plan on discharge was to transfer Mr. Marquise Clay to a local skilled nursing

facility, namely [facility].  However, the patient changed his mind and

his adult sister came to the hospital and took the patient home.  The risks of not

transferring to a skilled nursing facility for further intravenous antibiotic therapy

was discussed with the patient.  However, the patient was adamant about being discharged

home. 

 

DISCHARGE DIAGNOSES:  

1. Acute on chronic diastolic congestive heart failure, resolved.

2. End-stage renal disease.

3. Life-threatening hyperkalemia, resolved.

4. Klebsiella pneumoniae infected buttock wound.

5. Type 2 diabetes mellitus with severe retinopathy and peripheral adenopathy.

6. Hypertensive heart disease.

7. Anemia secondary to chronic disease.

8. Legal blindness.

9. Physical debility.

 

CONDITION ON DISCHARGE:  Stable with an overall fair prognosis.

 

DISCHARGE MEDICATIONS:  

1. Mupirocin 2% ointment to be applied to both the patient's nostrils and his scrotal

and buttock wounds twice a day for the next 10 days. 

2. Ciprofloxacin 500 mg taken daily after hemodialysis Monday, Wednesday, Friday for

total of nine doses. 

3. Sevelamer 2400 mg daily.

4. Pantoprazole 40 mg daily.

5. Clopidogrel 75 mg daily.

6. Calcium acetate 667 mg t.i.d. with meals.

7. Hydralazine 25 mg every 8 hours.

8. Acetaminophen 650 mg every 4 hours p.r.n. temperature 99.5, higher.

9. Metoprolol succinate 50 mg daily.

 

FOLLOWUP INSTRUCTIONS:  The patient instructed to follow up with Dr. Ford within the

next two weeks.  Once again, the patient is not transferring to a local skilled nursing

facility due to the fact the patient decided to be discharged home with his adult

sister. 

 

 

 

 

______________________________

MD ALICJA eGrardo/ORQUIDEA

D:  07/24/2020 09:59:11

T:  07/24/2020 10:28:31

Job #:  [id number]